# Patient Record
Sex: FEMALE | HISPANIC OR LATINO | Employment: FULL TIME | URBAN - METROPOLITAN AREA
[De-identification: names, ages, dates, MRNs, and addresses within clinical notes are randomized per-mention and may not be internally consistent; named-entity substitution may affect disease eponyms.]

---

## 2020-12-31 ENCOUNTER — HOSPITAL ENCOUNTER (OUTPATIENT)
Dept: LAB | Facility: MEDICAL CENTER | Age: 40
End: 2020-12-31
Attending: INTERNAL MEDICINE
Payer: COMMERCIAL

## 2020-12-31 LAB
ALBUMIN SERPL BCP-MCNC: 4.2 G/DL (ref 3.2–4.9)
ALBUMIN/GLOB SERPL: 1.7 G/DL
ALP SERPL-CCNC: 46 U/L (ref 30–99)
ALT SERPL-CCNC: 22 U/L (ref 2–50)
AMYLASE SERPL-CCNC: 43 U/L (ref 20–103)
ANION GAP SERPL CALC-SCNC: 7 MMOL/L (ref 7–16)
AST SERPL-CCNC: 15 U/L (ref 12–45)
BASOPHILS # BLD AUTO: 0.7 % (ref 0–1.8)
BASOPHILS # BLD: 0.04 K/UL (ref 0–0.12)
BILIRUB SERPL-MCNC: 0.6 MG/DL (ref 0.1–1.5)
BUN SERPL-MCNC: 10 MG/DL (ref 8–22)
CALCIUM SERPL-MCNC: 8.9 MG/DL (ref 8.5–10.5)
CHLORIDE SERPL-SCNC: 107 MMOL/L (ref 96–112)
CO2 SERPL-SCNC: 25 MMOL/L (ref 20–33)
CREAT SERPL-MCNC: 0.45 MG/DL (ref 0.5–1.4)
EOSINOPHIL # BLD AUTO: 0.17 K/UL (ref 0–0.51)
EOSINOPHIL NFR BLD: 3.1 % (ref 0–6.9)
ERYTHROCYTE [DISTWIDTH] IN BLOOD BY AUTOMATED COUNT: 45.3 FL (ref 35.9–50)
GLOBULIN SER CALC-MCNC: 2.5 G/DL (ref 1.9–3.5)
GLUCOSE SERPL-MCNC: 75 MG/DL (ref 65–99)
HCT VFR BLD AUTO: 40.5 % (ref 37–47)
HGB BLD-MCNC: 12.9 G/DL (ref 12–16)
IMM GRANULOCYTES # BLD AUTO: 0.01 K/UL (ref 0–0.11)
IMM GRANULOCYTES NFR BLD AUTO: 0.2 % (ref 0–0.9)
LIPASE SERPL-CCNC: 31 U/L (ref 11–82)
LYMPHOCYTES # BLD AUTO: 2.37 K/UL (ref 1–4.8)
LYMPHOCYTES NFR BLD: 43.4 % (ref 22–41)
MCH RBC QN AUTO: 27 PG (ref 27–33)
MCHC RBC AUTO-ENTMCNC: 31.9 G/DL (ref 33.6–35)
MCV RBC AUTO: 84.7 FL (ref 81.4–97.8)
MONOCYTES # BLD AUTO: 0.42 K/UL (ref 0–0.85)
MONOCYTES NFR BLD AUTO: 7.7 % (ref 0–13.4)
NEUTROPHILS # BLD AUTO: 2.45 K/UL (ref 2–7.15)
NEUTROPHILS NFR BLD: 44.9 % (ref 44–72)
NRBC # BLD AUTO: 0 K/UL
NRBC BLD-RTO: 0 /100 WBC
PLATELET # BLD AUTO: 242 K/UL (ref 164–446)
PMV BLD AUTO: 12.4 FL (ref 9–12.9)
POTASSIUM SERPL-SCNC: 4.1 MMOL/L (ref 3.6–5.5)
PROT SERPL-MCNC: 6.7 G/DL (ref 6–8.2)
RBC # BLD AUTO: 4.78 M/UL (ref 4.2–5.4)
SODIUM SERPL-SCNC: 139 MMOL/L (ref 135–145)
WBC # BLD AUTO: 5.5 K/UL (ref 4.8–10.8)

## 2020-12-31 PROCEDURE — 80053 COMPREHEN METABOLIC PANEL: CPT

## 2020-12-31 PROCEDURE — 83690 ASSAY OF LIPASE: CPT

## 2020-12-31 PROCEDURE — 82150 ASSAY OF AMYLASE: CPT

## 2020-12-31 PROCEDURE — 36415 COLL VENOUS BLD VENIPUNCTURE: CPT

## 2020-12-31 PROCEDURE — 83497 ASSAY OF 5-HIAA: CPT

## 2020-12-31 PROCEDURE — 85025 COMPLETE CBC W/AUTO DIFF WBC: CPT

## 2020-12-31 PROCEDURE — 86316 IMMUNOASSAY TUMOR OTHER: CPT

## 2021-01-04 LAB — CGA SERPL-MCNC: 54 NG/ML (ref 0–103)

## 2021-01-06 LAB
5HIAA & CREATININE UR-IMP: NORMAL
5OH-INDOLEACETATE 24H UR-MCNC: 2 MG/L
5OH-INDOLEACETATE 24H UR-MRATE: 8 MG/D (ref 0–15)
5OH-INDOLEACETATE/CREAT 24H UR: 6 MG/GCR (ref 0–14)
COLLECT DURATION TIME SPEC: 24 HRS
CREAT 24H UR-MCNC: 34 MG/DL
CREAT 24H UR-MRATE: 1326 MG/D (ref 700–1600)
SPECIMEN VOL ?TM UR: 3900 ML

## 2021-12-13 ENCOUNTER — HOSPITAL ENCOUNTER (OUTPATIENT)
Dept: LAB | Facility: MEDICAL CENTER | Age: 41
End: 2021-12-13
Attending: INTERNAL MEDICINE
Payer: COMMERCIAL

## 2021-12-13 PROCEDURE — 86316 IMMUNOASSAY TUMOR OTHER: CPT

## 2021-12-13 PROCEDURE — 36415 COLL VENOUS BLD VENIPUNCTURE: CPT

## 2021-12-15 ENCOUNTER — HOSPITAL ENCOUNTER (OUTPATIENT)
Facility: MEDICAL CENTER | Age: 41
End: 2021-12-15
Attending: INTERNAL MEDICINE
Payer: COMMERCIAL

## 2021-12-15 PROCEDURE — 83497 ASSAY OF 5-HIAA: CPT

## 2021-12-16 LAB — CGA SERPL-MCNC: 31 NG/ML (ref 0–103)

## 2021-12-19 LAB
5HIAA & CREATININE UR-IMP: ABNORMAL
5OH-INDOLEACETATE 24H UR-MCNC: 2.1 MG/L
5OH-INDOLEACETATE 24H UR-MRATE: 6 MG/D (ref 0–15)
5OH-INDOLEACETATE/CREAT 24H UR: 4 MG/GCR (ref 0–14)
COLLECT DURATION TIME SPEC: 24 HRS
CREAT 24H UR-MCNC: 58 MG/DL
CREAT 24H UR-MRATE: 1653 MG/D (ref 700–1600)
SPECIMEN VOL ?TM UR: ABNORMAL ML

## 2023-08-04 ENCOUNTER — TELEPHONE (OUTPATIENT)
Dept: HEALTH INFORMATION MANAGEMENT | Facility: OTHER | Age: 43
End: 2023-08-04
Payer: COMMERCIAL

## 2023-11-08 ENCOUNTER — OFFICE VISIT (OUTPATIENT)
Dept: CARDIOLOGY | Facility: MEDICAL CENTER | Age: 43
End: 2023-11-08
Attending: INTERNAL MEDICINE
Payer: COMMERCIAL

## 2023-11-08 VITALS
HEART RATE: 60 BPM | DIASTOLIC BLOOD PRESSURE: 72 MMHG | RESPIRATION RATE: 20 BRPM | SYSTOLIC BLOOD PRESSURE: 114 MMHG | WEIGHT: 252 LBS | HEIGHT: 61 IN | OXYGEN SATURATION: 95 % | BODY MASS INDEX: 47.58 KG/M2

## 2023-11-08 DIAGNOSIS — K21.9 GASTROESOPHAGEAL REFLUX DISEASE, UNSPECIFIED WHETHER ESOPHAGITIS PRESENT: ICD-10-CM

## 2023-11-08 DIAGNOSIS — R07.89 OTHER CHEST PAIN: ICD-10-CM

## 2023-11-08 LAB — EKG IMPRESSION: NORMAL

## 2023-11-08 PROCEDURE — 99212 OFFICE O/P EST SF 10 MIN: CPT | Performed by: INTERNAL MEDICINE

## 2023-11-08 PROCEDURE — 3074F SYST BP LT 130 MM HG: CPT | Performed by: INTERNAL MEDICINE

## 2023-11-08 PROCEDURE — 93010 ELECTROCARDIOGRAM REPORT: CPT | Performed by: INTERNAL MEDICINE

## 2023-11-08 PROCEDURE — 99204 OFFICE O/P NEW MOD 45 MIN: CPT | Performed by: INTERNAL MEDICINE

## 2023-11-08 PROCEDURE — 3078F DIAST BP <80 MM HG: CPT | Performed by: INTERNAL MEDICINE

## 2023-11-08 PROCEDURE — 99211 OFF/OP EST MAY X REQ PHY/QHP: CPT | Performed by: INTERNAL MEDICINE

## 2023-11-08 PROCEDURE — 93005 ELECTROCARDIOGRAM TRACING: CPT | Performed by: INTERNAL MEDICINE

## 2023-11-08 RX ORDER — OMEPRAZOLE 20 MG/1
20 CAPSULE, DELAYED RELEASE ORAL DAILY
Qty: 30 CAPSULE | Refills: 0 | Status: SHIPPED | OUTPATIENT
Start: 2023-11-08

## 2023-11-08 RX ORDER — METOPROLOL TARTRATE 75 MG/1
1 TABLET, FILM COATED ORAL 2 TIMES DAILY
COMMUNITY
Start: 2023-10-09

## 2023-11-08 RX ORDER — AMLODIPINE BESYLATE 2.5 MG/1
2.5 TABLET ORAL
COMMUNITY

## 2023-11-08 ASSESSMENT — ENCOUNTER SYMPTOMS
DIARRHEA: 0
FLANK PAIN: 0
ORTHOPNEA: 0
PND: 0
NAUSEA: 0
CLAUDICATION: 0
DECREASED APPETITE: 0
SHORTNESS OF BREATH: 0
CONSTIPATION: 0
DIZZINESS: 0
VOMITING: 0
DYSPNEA ON EXERTION: 0
BLURRED VISION: 0
FEVER: 0
ABDOMINAL PAIN: 0
PALPITATIONS: 0
WEIGHT GAIN: 0
ALTERED MENTAL STATUS: 0
COUGH: 0
NEAR-SYNCOPE: 0
WEIGHT LOSS: 0
BACK PAIN: 0
DEPRESSION: 0
HEARTBURN: 0
IRREGULAR HEARTBEAT: 0
SYNCOPE: 0

## 2023-11-08 ASSESSMENT — FIBROSIS 4 INDEX: FIB4 SCORE: 0.43

## 2023-11-08 NOTE — PROGRESS NOTES
Cardiology Note    Chief Complaint   Patient presents with    Chest Pain     NP: :Unspecified Chest Pain       History of Present Illness: Noemí De La Cruz is a 43 y.o. female PMH HTN, GERD, anxiety who presents for initial visit. Referred for chest pain by Emerita MCGHEE.     Describes exertional chest pressure. Associated with dyspnea and palpitations. Suspects started about five years ago. Suspect worse in past 6 months. Has also noted gained weight during this time. Works as  but states it is manual and walks a lot. Can complete a work day. Describes central chest pressure. Describes symptoms last about 3-4 hours. Rests and stops moving which improves symptoms. Denies toxic social habits. Father with premature coronary disease however not heart healthy lifestyle.    Review of Systems   Constitutional: Negative for decreased appetite, fever, malaise/fatigue, weight gain and weight loss.   HENT:  Negative for congestion and nosebleeds.    Eyes:  Negative for blurred vision.   Cardiovascular:  Positive for chest pain. Negative for claudication, dyspnea on exertion, irregular heartbeat, leg swelling, near-syncope, orthopnea, palpitations, paroxysmal nocturnal dyspnea and syncope.   Respiratory:  Negative for cough and shortness of breath.    Endocrine: Negative for cold intolerance and heat intolerance.   Skin:  Negative for rash.   Musculoskeletal:  Negative for back pain.   Gastrointestinal:  Negative for abdominal pain, constipation, diarrhea, heartburn, melena, nausea and vomiting.   Genitourinary:  Negative for dysuria, flank pain and hematuria.   Neurological:  Negative for dizziness.   Psychiatric/Behavioral:  Negative for altered mental status and depression.          Past Medical History:   Diagnosis Date    Anxiety     Dx  02/2012    Gastroesophageal reflux disease 11/8/2023         Past Surgical History:   Procedure Laterality Date    REPEAT C SECTION W TUBAL LIGATION  11/12/2012     "Performed by Mayela Mendoza M.D. at [facility]    PRIMARY C SECTION  07/07/1999    repeat    PRIMARY C SECTION  11/28/1997    unable to pogress         Current Outpatient Medications   Medication Sig Dispense Refill    amLODIPine (NORVASC) 2.5 MG Tab Take 2.5 mg by mouth every day.      Metoprolol Tartrate 75 MG Tab Take 1 Tablet by mouth 2 times a day.      MAGNESIUM PO Take  by mouth.      omeprazole (PRILOSEC) 20 MG delayed-release capsule Take 1 Capsule by mouth every day. 30 Capsule 0    ibuprofen (MOTRIN) 800 MG TABS Take 1 Tab by mouth every 8 hours as needed (Cramping). 40 Tab 0     No current facility-administered medications for this visit.         Allergies   Allergen Reactions    Nkda [No Known Drug Allergy]          Family History   Problem Relation Age of Onset    Diabetes Mother     Lung Disease Father          Social History     Socioeconomic History    Marital status: Single     Spouse name: Not on file    Number of children: Not on file    Years of education: Not on file    Highest education level: Not on file   Occupational History    Not on file   Tobacco Use    Smoking status: Never    Smokeless tobacco: Not on file   Substance and Sexual Activity    Alcohol use: No    Drug use: No    Sexual activity: Not Currently     Partners: Male   Other Topics Concern    Not on file   Social History Narrative    Not on file     Social Determinants of Health     Financial Resource Strain: Not on file   Food Insecurity: Not on file   Transportation Needs: Not on file   Physical Activity: Not on file   Stress: Not on file   Social Connections: Not on file   Intimate Partner Violence: Not on file   Housing Stability: Not on file         Physical Exam:  Ambulatory Vitals  /72 (BP Location: Left arm, Patient Position: Sitting, BP Cuff Size: Adult)   Pulse 60   Resp 20   Ht 1.549 m (5' 1\")   Wt 114 kg (252 lb)   SpO2 95%    BP Readings from Last 4 Encounters:   11/08/23 114/72   12/18/12 " "124/82   11/21/12 116/84   11/15/12 129/82     Weight/BMI:   Vitals:    11/08/23 1001   BP: 114/72   Weight: 114 kg (252 lb)   Height: 1.549 m (5' 1\")    Body mass index is 47.61 kg/m².  Wt Readings from Last 4 Encounters:   11/08/23 114 kg (252 lb)   12/18/12 98.9 kg (218 lb)   11/21/12 95.7 kg (211 lb)   11/12/12 102 kg (225 lb 15.5 oz)       Physical Exam  Constitutional:       General: She is not in acute distress.  HENT:      Head: Normocephalic and atraumatic.   Eyes:      Conjunctiva/sclera: Conjunctivae normal.      Pupils: Pupils are equal, round, and reactive to light.   Neck:      Vascular: No JVD.   Cardiovascular:      Rate and Rhythm: Normal rate and regular rhythm.      Heart sounds: Normal heart sounds. No murmur heard.     No friction rub. No gallop.   Pulmonary:      Effort: Pulmonary effort is normal. No respiratory distress.      Breath sounds: Normal breath sounds. No wheezing or rales.   Chest:      Chest wall: No tenderness.   Abdominal:      General: Bowel sounds are normal. There is no distension.      Palpations: Abdomen is soft.   Musculoskeletal:      Cervical back: Normal range of motion and neck supple.   Skin:     General: Skin is warm and dry.   Neurological:      Mental Status: She is alert and oriented to person, place, and time.   Psychiatric:         Mood and Affect: Affect normal.         Judgment: Judgment normal.         Lab Data Review:  No results found for: \"CHOLSTRLTOT\", \"LDL\", \"HDL\", \"TRIGLYCERIDE\"    Lab Results   Component Value Date/Time    SODIUM 136 09/11/2022 04:27 PM    SODIUM 139 12/31/2020 08:45 AM    POTASSIUM 3.4 (L) 09/11/2022 04:27 PM    POTASSIUM 4.1 12/31/2020 08:45 AM    CHLORIDE 101 (L) 09/11/2022 04:27 PM    CHLORIDE 107 12/31/2020 08:45 AM    CO2 26 09/11/2022 04:27 PM    CO2 25 12/31/2020 08:45 AM    GLUCOSE 135 (H) 09/11/2022 04:27 PM    GLUCOSE 75 12/31/2020 08:45 AM    BUN 15 09/11/2022 04:27 PM    BUN 10 12/31/2020 08:45 AM    CREATININE 0.60 " "09/11/2022 04:32 PM    CREATININE 0.45 (L) 12/31/2020 08:45 AM    GLOMRATE 103 09/11/2022 04:27 PM     CrCl cannot be calculated (Patient's most recent lab result is older than the maximum 7 days allowed.).  Lab Results   Component Value Date/Time    ALKPHOSPHAT 79 09/11/2022 04:27 PM    ALKPHOSPHAT 46 12/31/2020 08:45 AM    ASTSGOT 12 (L) 09/11/2022 04:27 PM    ASTSGOT 15 12/31/2020 08:45 AM    ALTSGPT 30 09/11/2022 04:27 PM    ALTSGPT 22 12/31/2020 08:45 AM    TBILIRUBIN 1.2 09/11/2022 04:27 PM    TBILIRUBIN 0.6 12/31/2020 08:45 AM      Lab Results   Component Value Date/Time    WBC 5.5 12/31/2020 08:45 AM     No results found for: \"HBA1C\"  No components found for: \"TROP\"      Cardiac Imaging and Procedures Review:      EKG 11/8/23 interpreted by me normal sinus    Medical Decision Making:  Problem List Items Addressed This Visit       Other chest pain    Relevant Orders    EKG (Completed)    [id number] PET W/CT ATTENUATION    EC-ECHOCARDIOGRAM COMPLETE W/O CONT    Gastroesophageal reflux disease    Relevant Medications    omeprazole (PRILOSEC) 20 MG delayed-release capsule     Chest pressure with typical angina - check PET stress due to body habitus and echocardiogram. Alternate etiology also possible including gerd and anxiety. Trial PPI as well. If resolves symptoms more likely gerd.     It was my pleasure to meet with Ms. Roberto De La Cruz.                       "

## 2023-12-15 ENCOUNTER — HOSPITAL ENCOUNTER (OUTPATIENT)
Dept: RADIOLOGY | Facility: MEDICAL CENTER | Age: 43
End: 2023-12-15
Attending: INTERNAL MEDICINE
Payer: COMMERCIAL

## 2023-12-15 DIAGNOSIS — R07.89 OTHER CHEST PAIN: ICD-10-CM

## 2023-12-15 PROCEDURE — 78431 MYOCRD IMG PET RST&STRS CT: CPT

## 2023-12-20 PROCEDURE — 78431 MYOCRD IMG PET RST&STRS CT: CPT | Mod: 26 | Performed by: INTERNAL MEDICINE

## 2023-12-20 PROCEDURE — 93018 CV STRESS TEST I&R ONLY: CPT | Performed by: INTERNAL MEDICINE

## 2024-01-04 ENCOUNTER — HOSPITAL ENCOUNTER (OUTPATIENT)
Dept: CARDIOLOGY | Facility: MEDICAL CENTER | Age: 44
End: 2024-01-04
Attending: INTERNAL MEDICINE
Payer: COMMERCIAL

## 2024-01-04 DIAGNOSIS — R07.89 OTHER CHEST PAIN: ICD-10-CM

## 2024-01-04 PROCEDURE — 93306 TTE W/DOPPLER COMPLETE: CPT

## 2024-01-06 LAB
LV EJECT FRACT  99904: 66
LV EJECT FRACT MOD 2C 99903: 70.85
LV EJECT FRACT MOD 4C 99902: 59.17
LV EJECT FRACT MOD BP 99901: 66.07

## 2024-01-06 PROCEDURE — 93306 TTE W/DOPPLER COMPLETE: CPT | Mod: 26 | Performed by: INTERNAL MEDICINE
